# Patient Record
Sex: FEMALE
[De-identification: names, ages, dates, MRNs, and addresses within clinical notes are randomized per-mention and may not be internally consistent; named-entity substitution may affect disease eponyms.]

---

## 2024-06-27 ENCOUNTER — DASHBOARD ENCOUNTERS (OUTPATIENT)
Age: 74
End: 2024-06-27

## 2024-07-10 ENCOUNTER — OFFICE VISIT (OUTPATIENT)
Dept: RURAL CLINIC 2 | Facility: CLINIC | Age: 74
End: 2024-07-10

## 2024-07-10 RX ORDER — FUROSEMIDE 20 MG/1
TABLET ORAL
Qty: 90 TABLET | Status: ACTIVE | COMMUNITY

## 2024-07-10 RX ORDER — BUPROPION HYDROCHLORIDE 150 MG/1
TABLET, EXTENDED RELEASE ORAL
Qty: 90 TABLET | Status: ACTIVE | COMMUNITY

## 2024-07-10 RX ORDER — NITROGLYCERIN 0.4 MG/1
TABLET SUBLINGUAL
Qty: 25 TABLET | Status: ACTIVE | COMMUNITY

## 2024-07-10 RX ORDER — BUDESONIDE 3 MG/1
CAPSULE, COATED PELLETS ORAL
Qty: 50 CAPSULE | Status: ACTIVE | COMMUNITY

## 2024-07-10 RX ORDER — METHOCARBAMOL 750 MG/1
TAKE 1 TABLET BY MOUTH THREE TIMES DAILY AS NEEDED TABLET ORAL
Qty: 90 EACH | Refills: 1 | Status: ACTIVE | COMMUNITY

## 2024-07-10 RX ORDER — DOXYCYCLINE 100 MG/1
TAKE 1 CAPSULE BY MOUTH TWICE DAILY FOR 7 DAYS CAPSULE ORAL
Qty: 14 EACH | Refills: 0 | Status: ACTIVE | COMMUNITY

## 2024-07-10 RX ORDER — DILTIAZEM HYDROCHLORIDE 120 MG/1
TAKE ONE CAPSULE BY MOUTH TWICE DAILY CAPSULE, EXTENDED RELEASE ORAL
Qty: 60 EACH | Refills: 0 | Status: ACTIVE | COMMUNITY

## 2024-07-10 RX ORDER — PREDNISONE 20 MG/1
TAKE 1 TABLET BY MOUTH TWICE DAILY FOR 5 DAYS TABLET ORAL
Qty: 10 EACH | Refills: 0 | Status: ACTIVE | COMMUNITY

## 2024-07-10 RX ORDER — APIXABAN 5 MG/1
TABLET, FILM COATED ORAL
Qty: 60 TABLET | Status: ACTIVE | COMMUNITY

## 2024-07-10 RX ORDER — LEVOTHYROXINE SODIUM 75 UG/1
TAKE ONE TABLET BY MOUTH EVERY MORNING ON AN EMPTY STOMACH 30 MINUTES BEFORE BREAKFAST TABLET ORAL
Qty: 90 EACH | Refills: 0 | Status: ACTIVE | COMMUNITY

## 2024-07-10 RX ORDER — FLUTICASONE FUROATE, UMECLIDINIUM BROMIDE AND VILANTEROL TRIFENATATE 200; 62.5; 25 UG/1; UG/1; UG/1
POWDER RESPIRATORY (INHALATION)
Qty: 60 EACH | Status: ACTIVE | COMMUNITY

## 2024-07-10 RX ORDER — AMIODARONE HYDROCHLORIDE 200 MG/1
TAKE 1 TABLET BY MOUTH TWICE DAILY TABLET ORAL
Qty: 60 EACH | Refills: 0 | Status: ACTIVE | COMMUNITY

## 2024-07-10 RX ORDER — LEVALBUTEROL HYDROCHLORIDE 1.25 MG/3ML
USE 1 VIAL VIA NEBULIZER THREE TIMES DAILY FOR 14 DAYS SOLUTION RESPIRATORY (INHALATION)
Qty: 225 MILLILITER | Refills: 0 | Status: ACTIVE | COMMUNITY

## 2024-07-10 RX ORDER — ESTRADIOL 0.04 MG/D
PATCH TRANSDERMAL
Qty: 24 PATCH | Status: ACTIVE | COMMUNITY

## 2024-07-10 RX ORDER — ROSUVASTATIN CALCIUM 20 MG/1
TAKE 1 TABLET TABLET BY MOUTH IN THE EVENING TABLET, FILM COATED ORAL
Qty: 90 EACH | Refills: 1 | Status: ACTIVE | COMMUNITY

## 2024-07-10 RX ORDER — LOSARTAN POTASSIUM 100 MG/1
TAKE 1 TABLET BY MOUTH DAILY TABLET, FILM COATED ORAL
Qty: 90 EACH | Refills: 0 | Status: ACTIVE | COMMUNITY

## 2024-07-10 RX ORDER — ALBUTEROL SULFATE 90 UG/1
INHALE 1 PUFF BY MOUTH EVERY 4 HOURS FOR 14 DAYS AS NEEDED FOR WHEEZING AEROSOL, METERED RESPIRATORY (INHALATION)
Qty: 8.5 GRAM | Refills: 0 | Status: ACTIVE | COMMUNITY

## 2024-07-19 ENCOUNTER — LAB OUTSIDE AN ENCOUNTER (OUTPATIENT)
Dept: RURAL CLINIC 2 | Facility: CLINIC | Age: 74
End: 2024-07-19

## 2024-07-19 ENCOUNTER — OFFICE VISIT (OUTPATIENT)
Dept: RURAL CLINIC 2 | Facility: CLINIC | Age: 74
End: 2024-07-19
Payer: COMMERCIAL

## 2024-07-19 VITALS
HEART RATE: 73 BPM | TEMPERATURE: 97.3 F | SYSTOLIC BLOOD PRESSURE: 137 MMHG | BODY MASS INDEX: 27.29 KG/M2 | WEIGHT: 154 LBS | DIASTOLIC BLOOD PRESSURE: 63 MMHG | HEIGHT: 63 IN

## 2024-07-19 DIAGNOSIS — D50.8 OTHER IRON DEFICIENCY ANEMIA: ICD-10-CM

## 2024-07-19 DIAGNOSIS — Z79.01 CHRONIC ANTICOAGULATION: ICD-10-CM

## 2024-07-19 DIAGNOSIS — I48.91 ATRIAL FIBRILLATION, UNSPECIFIED TYPE: ICD-10-CM

## 2024-07-19 PROBLEM — 428283002: Status: ACTIVE | Noted: 2024-07-19

## 2024-07-19 PROBLEM — 711150003: Status: ACTIVE | Noted: 2024-07-19

## 2024-07-19 PROBLEM — 49436004: Status: ACTIVE | Noted: 2024-07-19

## 2024-07-19 PROBLEM — 112561000119108: Status: ACTIVE | Noted: 2024-07-19

## 2024-07-19 PROBLEM — 84229001: Status: ACTIVE | Noted: 2024-07-19

## 2024-07-19 PROBLEM — 87522002: Status: ACTIVE | Noted: 2024-07-19

## 2024-07-19 PROBLEM — 347861000119105: Status: ACTIVE | Noted: 2024-07-19

## 2024-07-19 PROCEDURE — 99244 OFF/OP CNSLTJ NEW/EST MOD 40: CPT | Performed by: NURSE PRACTITIONER

## 2024-07-19 PROCEDURE — 99204 OFFICE O/P NEW MOD 45 MIN: CPT | Performed by: NURSE PRACTITIONER

## 2024-07-19 RX ORDER — OMEPRAZOLE 20 MG/1
1 CAPSULE 30 MINUTES BEFORE MORNING MEAL CAPSULE, DELAYED RELEASE ORAL ONCE A DAY
Status: ACTIVE | COMMUNITY

## 2024-07-19 RX ORDER — PREDNISONE 20 MG/1
TAKE 1 TABLET BY MOUTH TWICE DAILY FOR 5 DAYS TABLET ORAL
Qty: 10 EACH | Refills: 0 | Status: DISCONTINUED | COMMUNITY

## 2024-07-19 RX ORDER — ROSUVASTATIN CALCIUM 20 MG/1
TAKE 1 TABLET TABLET BY MOUTH IN THE EVENING TABLET, FILM COATED ORAL
Qty: 90 EACH | Refills: 1 | Status: ACTIVE | COMMUNITY

## 2024-07-19 RX ORDER — APIXABAN 5 MG/1
TABLET, FILM COATED ORAL
Qty: 60 TABLET | Status: ACTIVE | COMMUNITY

## 2024-07-19 RX ORDER — DOXYCYCLINE 100 MG/1
TAKE 1 CAPSULE BY MOUTH TWICE DAILY FOR 7 DAYS CAPSULE ORAL
Qty: 14 EACH | Refills: 0 | Status: DISCONTINUED | COMMUNITY

## 2024-07-19 RX ORDER — BUPROPION HYDROCHLORIDE 150 MG/1
TABLET, EXTENDED RELEASE ORAL
Qty: 90 TABLET | Status: ACTIVE | COMMUNITY

## 2024-07-19 RX ORDER — FUROSEMIDE 20 MG/1
TABLET ORAL
Qty: 90 TABLET | Status: ACTIVE | COMMUNITY

## 2024-07-19 RX ORDER — LOSARTAN POTASSIUM 100 MG/1
TAKE 1 TABLET BY MOUTH DAILY TABLET, FILM COATED ORAL
Qty: 90 EACH | Refills: 0 | Status: DISCONTINUED | COMMUNITY

## 2024-07-19 RX ORDER — ALBUTEROL SULFATE 90 UG/1
INHALE 1 PUFF BY MOUTH EVERY 4 HOURS FOR 14 DAYS AS NEEDED FOR WHEEZING AEROSOL, METERED RESPIRATORY (INHALATION)
Qty: 8.5 GRAM | Refills: 0 | Status: DISCONTINUED | COMMUNITY

## 2024-07-19 RX ORDER — ESTRADIOL 0.04 MG/D
PATCH TRANSDERMAL
Qty: 24 PATCH | Status: ACTIVE | COMMUNITY

## 2024-07-19 RX ORDER — NITROGLYCERIN 0.4 MG/1
TABLET SUBLINGUAL
Qty: 25 TABLET | Status: DISCONTINUED | COMMUNITY

## 2024-07-19 RX ORDER — DILTIAZEM HYDROCHLORIDE 120 MG/1
TAKE ONE CAPSULE BY MOUTH TWICE DAILY CAPSULE, EXTENDED RELEASE ORAL
Qty: 60 EACH | Refills: 0 | Status: ACTIVE | COMMUNITY

## 2024-07-19 RX ORDER — HYDROCODONE BITARTRATE AND ACETAMINOPHEN 5; 325 MG/1; MG/1
1 TABLET AS NEEDED TABLET ORAL
Status: ACTIVE | COMMUNITY

## 2024-07-19 RX ORDER — AMIODARONE HYDROCHLORIDE 200 MG/1
TAKE 1 TABLET BY MOUTH TWICE DAILY TABLET ORAL
Qty: 60 EACH | Refills: 0 | Status: DISCONTINUED | COMMUNITY

## 2024-07-19 RX ORDER — FLUTICASONE FUROATE, UMECLIDINIUM BROMIDE AND VILANTEROL TRIFENATATE 200; 62.5; 25 UG/1; UG/1; UG/1
POWDER RESPIRATORY (INHALATION)
Qty: 60 EACH | Status: ACTIVE | COMMUNITY

## 2024-07-19 RX ORDER — BUDESONIDE 3 MG/1
CAPSULE, COATED PELLETS ORAL
Qty: 50 CAPSULE | Status: DISCONTINUED | COMMUNITY

## 2024-07-19 RX ORDER — NICOTINE 14MG/24HR
AS DIRECTED PATCH, TRANSDERMAL 24 HOURS TRANSDERMAL
Status: ACTIVE | COMMUNITY

## 2024-07-19 RX ORDER — AZELASTINE HYDROCHLORIDE AND FLUTICASONE PROPIONATE 137; 50 UG/1; UG/1
1 SPRAY IN EACH NOSTRIL SPRAY, METERED NASAL TWICE A DAY
Status: ACTIVE | COMMUNITY

## 2024-07-19 RX ORDER — LEVOTHYROXINE SODIUM 75 UG/1
TAKE ONE TABLET BY MOUTH EVERY MORNING ON AN EMPTY STOMACH 30 MINUTES BEFORE BREAKFAST TABLET ORAL
Qty: 90 EACH | Refills: 0 | Status: ACTIVE | COMMUNITY

## 2024-07-19 RX ORDER — LEVALBUTEROL HYDROCHLORIDE 1.25 MG/3ML
USE 1 VIAL VIA NEBULIZER THREE TIMES DAILY FOR 14 DAYS SOLUTION RESPIRATORY (INHALATION)
Qty: 225 MILLILITER | Refills: 0 | Status: DISCONTINUED | COMMUNITY

## 2024-07-19 RX ORDER — METHOCARBAMOL 750 MG/1
TAKE 1 TABLET BY MOUTH THREE TIMES DAILY AS NEEDED TABLET ORAL
Qty: 90 EACH | Refills: 1 | Status: DISCONTINUED | COMMUNITY

## 2024-07-19 NOTE — HPI-TODAY'S VISIT:
The pt is a 73 y/o female who presents on referral from Dr. Sirena Zepeda for [facility]. A copy of this document to be sent to the referring provider. She is currently taking an oral iron supplement. She is c/o daily fatigue. She has not had any SOB, cp and has not seen any visible GI bleeding. She said she was diagnosed with Crohn's disease and had a small bowel resection at the time of diagnosis in 1998. She was never placed on treatment for her Crohn's. Her last colonoscopy was done 2 yrs ago and recalls findings of colon polyps. History is significant for atrial fibrillation, on Eliquis.

## 2024-10-10 ENCOUNTER — OFFICE VISIT (OUTPATIENT)
Dept: RURAL MEDICAL CENTER 4 | Facility: MEDICAL CENTER | Age: 74
End: 2024-10-10